# Patient Record
Sex: FEMALE | Race: WHITE | NOT HISPANIC OR LATINO
[De-identification: names, ages, dates, MRNs, and addresses within clinical notes are randomized per-mention and may not be internally consistent; named-entity substitution may affect disease eponyms.]

---

## 2022-09-02 PROBLEM — Z00.00 ENCOUNTER FOR PREVENTIVE HEALTH EXAMINATION: Status: ACTIVE | Noted: 2022-09-02

## 2022-09-06 ENCOUNTER — APPOINTMENT (OUTPATIENT)
Dept: ORTHOPEDIC SURGERY | Facility: CLINIC | Age: 67
End: 2022-09-06

## 2022-09-06 VITALS — RESPIRATION RATE: 16 BRPM | WEIGHT: 123 LBS | BODY MASS INDEX: 20.74 KG/M2 | HEIGHT: 64.5 IN

## 2022-09-06 DIAGNOSIS — M79.89 OTHER SPECIFIED SOFT TISSUE DISORDERS: ICD-10-CM

## 2022-09-06 DIAGNOSIS — Z78.9 OTHER SPECIFIED HEALTH STATUS: ICD-10-CM

## 2022-09-06 DIAGNOSIS — Z82.61 FAMILY HISTORY OF ARTHRITIS: ICD-10-CM

## 2022-09-06 DIAGNOSIS — Z87.891 PERSONAL HISTORY OF NICOTINE DEPENDENCE: ICD-10-CM

## 2022-09-06 PROCEDURE — 99203 OFFICE O/P NEW LOW 30 MIN: CPT | Mod: 25

## 2022-09-06 PROCEDURE — 73110 X-RAY EXAM OF WRIST: CPT | Mod: 50

## 2022-09-06 PROCEDURE — 20612 ASPIRATE/INJ GANGLION CYST: CPT

## 2022-09-06 RX ORDER — RALOXIFENE HYDROCHLORIDE 60 MG/1
TABLET, FILM COATED ORAL
Refills: 0 | Status: ACTIVE | COMMUNITY

## 2022-09-06 NOTE — HISTORY OF PRESENT ILLNESS
[Right] : right hand dominant [FreeTextEntry1] : Patient presents to the office for evaluation of a right dorsal sided wrist soft tissue mass that appeared on 8/1/2022 (5 weeks and 1 day ago). She denies pain, numbness or tingling. Notes that it has slightly increased in size since its appearance. Patient has a history of two right dorsal wrist ganglion cysts that were surgically removed about 30 years ago, in the exact same location where she has the mass today.  He notes minimal pain at the site of the mass.  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

## 2022-09-06 NOTE — PROCEDURE
[FreeTextEntry1] : My impression is that the patient has a right dorsal ganglion cyst. We discussed various treatment options and the patient elected to undergo a cyst aspiration. The risks discussed included, but were not limited to, recurrence, infection, nerve injury, pain, etc. Under informed consent and sterile conditions the cyst was aspirated and several cc's of gelatinous fluid was removed. A sterile bandage was applied. It is my hopes that this is all that is required. Should it recur the patient could consider undergoing excision of the dorsal ganglion cyst\par \par

## 2022-09-06 NOTE — PHYSICAL EXAM
[de-identified] : Physical exam demonstrates the patient to be alert and oriented x 3 and capable of ambulation. The patient is well-developed and well-nourished in no apparent respiratory distress. The majority of the skin is intact bilaterally in the upper extremities without any bilateral elbow lymphadenopathy.\par \par Evaluation of both elbows reveals full symmetric range of motion from full extension to 140° of flexion with full pronation and full supination.\par \par The wrists have symmetric range of motion bilaterally.  Well-healed transverse incision over the dorsal aspect of the right wrist.  There is a soft tissue mass over the dorsal aspect of the right wrist. It is soft and mobile.  It transilluminates with a penlight and is nontender to palpation.  It does not move with active finger flexion and extension. There is no warmth, erythema, or signs of infection. There is no tenderness over the scaphoid, scapholunate, or lunotriquetral ligaments bilaterally. There is a negative Breaux's test bilaterally. There is no tenderness over the distal radial ulnar joint or TFCC and no evidence of instability bilaterally. There is no tenderness over the pisotriquetral joint, hamate hook, or CMC joints bilaterally. The patient is nontender over both scaphoids and anatomic snuffbox is bilaterally. There is no clubbing cyanosis or edema.\par \par Full, symmetric digital ROM. \par \par There is good capillary refill of the digits bilaterally. Sensation is intact to light touch bilaterally.\par \par \par  [de-identified] : Bilateral wrist x-rays were obtained today to assess for bony injury/abnormality.  No appreciation of acute fracture or dislocation.  The soft tissues of the dorsal aspect of the right wrist exhibit more fullness than at the left side.  No carpal malalignment.

## 2022-09-06 NOTE — ASSESSMENT
[FreeTextEntry1] : I had a lengthy discussion with the patient regarding the etiology of the patient's right dorsal wrist mass, likely being a ganglion cyst. Both nonoperative and operative treatment options were discussed, at length. The risks and benefits of each were communicated. I explained that management of the mass, likely a ganglion cyst, includes symptomatic management and avoidance of aggravating factors (rest, immobilization), aspiration or surgical excision. I explained that aspiration holds a higher recurrence rate than surgery, however, it is less invasive. I explained that surgical excision is associated with the lowest risk of recurrence and provides a definitive diagnosis after the specimen is sent to pathology. I explained that the likelihood of a malignant lesion involving the hand/wrist is very low but not 0% and this can only be confirmed with a surgical excision/biopsy. Surgical risks are discussed included infection, wound healing issues, loss of motion, stiffness, nerve injury, numbness and tingling, electrical-burning pain, tendon rupture, postoperative pain, CRPS, swelling, recurrence of the mass and the possibility of revision surgery or reoperation, especially if malignant findings are noted on biopsy, which would warrant a larger procedure including wide excision.  Shared decision making was made to proceed with a right dorsal ganglion cyst aspiration.  If this mass were to recur, consideration for surgical excision will be made as this is what the patient expresses preferring.  I directed her to follow back up with me sooner if the mass significantly increases in size within a short interval of time or she develops significant symptoms.\par

## 2023-04-21 ENCOUNTER — NON-APPOINTMENT (OUTPATIENT)
Age: 68
End: 2023-04-21

## 2023-04-21 ENCOUNTER — APPOINTMENT (OUTPATIENT)
Dept: OPHTHALMOLOGY | Facility: CLINIC | Age: 68
End: 2023-04-21
Payer: MEDICARE

## 2023-04-21 PROCEDURE — 92004 COMPRE OPH EXAM NEW PT 1/>: CPT

## 2023-04-21 PROCEDURE — 92134 CPTRZ OPH DX IMG PST SGM RTA: CPT

## 2023-04-21 PROCEDURE — 92201 OPSCPY EXTND RTA DRAW UNI/BI: CPT

## 2023-06-05 ENCOUNTER — APPOINTMENT (OUTPATIENT)
Dept: OPHTHALMOLOGY | Facility: CLINIC | Age: 68
End: 2023-06-05
Payer: MEDICARE

## 2023-06-05 ENCOUNTER — NON-APPOINTMENT (OUTPATIENT)
Age: 68
End: 2023-06-05

## 2023-06-05 PROCEDURE — 92012 INTRM OPH EXAM EST PATIENT: CPT

## 2023-10-27 ENCOUNTER — NON-APPOINTMENT (OUTPATIENT)
Age: 68
End: 2023-10-27

## 2023-10-27 ENCOUNTER — APPOINTMENT (OUTPATIENT)
Dept: OPHTHALMOLOGY | Facility: CLINIC | Age: 68
End: 2023-10-27
Payer: MEDICARE

## 2023-10-27 PROCEDURE — 92201 OPSCPY EXTND RTA DRAW UNI/BI: CPT

## 2023-10-27 PROCEDURE — 92014 COMPRE OPH EXAM EST PT 1/>: CPT

## 2023-10-27 PROCEDURE — 92134 CPTRZ OPH DX IMG PST SGM RTA: CPT

## 2024-11-04 ENCOUNTER — APPOINTMENT (OUTPATIENT)
Dept: OPHTHALMOLOGY | Facility: CLINIC | Age: 69
End: 2024-11-04
Payer: MEDICARE

## 2024-11-04 ENCOUNTER — NON-APPOINTMENT (OUTPATIENT)
Age: 69
End: 2024-11-04

## 2024-11-04 PROCEDURE — 92134 CPTRZ OPH DX IMG PST SGM RTA: CPT

## 2024-11-04 PROCEDURE — 92014 COMPRE OPH EXAM EST PT 1/>: CPT

## 2024-11-04 PROCEDURE — 92201 OPSCPY EXTND RTA DRAW UNI/BI: CPT
